# Patient Record
Sex: MALE | Race: WHITE | NOT HISPANIC OR LATINO | URBAN - METROPOLITAN AREA
[De-identification: names, ages, dates, MRNs, and addresses within clinical notes are randomized per-mention and may not be internally consistent; named-entity substitution may affect disease eponyms.]

---

## 2023-10-29 ENCOUNTER — HOSPITAL ENCOUNTER (EMERGENCY)
Facility: HOSPITAL | Age: 23
Discharge: HOME | End: 2023-10-29
Attending: STUDENT IN AN ORGANIZED HEALTH CARE EDUCATION/TRAINING PROGRAM
Payer: COMMERCIAL

## 2023-10-29 VITALS
HEART RATE: 90 BPM | HEIGHT: 72 IN | OXYGEN SATURATION: 98 % | RESPIRATION RATE: 14 BRPM | DIASTOLIC BLOOD PRESSURE: 87 MMHG | BODY MASS INDEX: 29.71 KG/M2 | WEIGHT: 219.36 LBS | SYSTOLIC BLOOD PRESSURE: 131 MMHG | TEMPERATURE: 97.7 F

## 2023-10-29 DIAGNOSIS — K62.5 RECTAL BLEEDING: Primary | ICD-10-CM

## 2023-10-29 LAB
ALBUMIN SERPL-MCNC: 4.8 G/DL (ref 3.5–5)
ALP BLD-CCNC: 94 U/L (ref 35–125)
ALT SERPL-CCNC: 61 U/L (ref 5–40)
ANION GAP SERPL CALC-SCNC: 11 MMOL/L
AST SERPL-CCNC: 31 U/L (ref 5–40)
BASOPHILS # BLD AUTO: 0.03 X10*3/UL (ref 0–0.1)
BASOPHILS NFR BLD AUTO: 0.4 %
BILIRUB SERPL-MCNC: 0.3 MG/DL (ref 0.1–1.2)
BUN SERPL-MCNC: 17 MG/DL (ref 8–25)
CALCIUM SERPL-MCNC: 9.8 MG/DL (ref 8.5–10.4)
CHLORIDE SERPL-SCNC: 100 MMOL/L (ref 97–107)
CO2 SERPL-SCNC: 28 MMOL/L (ref 24–31)
CREAT SERPL-MCNC: 0.9 MG/DL (ref 0.4–1.6)
EOSINOPHIL # BLD AUTO: 0.12 X10*3/UL (ref 0–0.7)
EOSINOPHIL NFR BLD AUTO: 1.6 %
ERYTHROCYTE [DISTWIDTH] IN BLOOD BY AUTOMATED COUNT: 11.7 % (ref 11.5–14.5)
GFR SERPL CREATININE-BSD FRML MDRD: >90 ML/MIN/1.73M*2
GLUCOSE SERPL-MCNC: 85 MG/DL (ref 65–99)
HCT VFR BLD AUTO: 47.6 % (ref 41–52)
HGB BLD-MCNC: 16.4 G/DL (ref 13.5–17.5)
IMM GRANULOCYTES # BLD AUTO: 0.02 X10*3/UL (ref 0–0.7)
IMM GRANULOCYTES NFR BLD AUTO: 0.3 % (ref 0–0.9)
LYMPHOCYTES # BLD AUTO: 2.15 X10*3/UL (ref 1.2–4.8)
LYMPHOCYTES NFR BLD AUTO: 28.5 %
MCH RBC QN AUTO: 28.8 PG (ref 26–34)
MCHC RBC AUTO-ENTMCNC: 34.5 G/DL (ref 32–36)
MCV RBC AUTO: 84 FL (ref 80–100)
MONOCYTES # BLD AUTO: 0.67 X10*3/UL (ref 0.1–1)
MONOCYTES NFR BLD AUTO: 8.9 %
NEUTROPHILS # BLD AUTO: 4.55 X10*3/UL (ref 1.2–7.7)
NEUTROPHILS NFR BLD AUTO: 60.3 %
NRBC BLD-RTO: 0 /100 WBCS (ref 0–0)
PLATELET # BLD AUTO: 314 X10*3/UL (ref 150–450)
PMV BLD AUTO: 10 FL (ref 7.5–11.5)
POTASSIUM SERPL-SCNC: 4.2 MMOL/L (ref 3.4–5.1)
PROT SERPL-MCNC: 8 G/DL (ref 5.9–7.9)
RBC # BLD AUTO: 5.69 X10*6/UL (ref 4.5–5.9)
SODIUM SERPL-SCNC: 139 MMOL/L (ref 133–145)
WBC # BLD AUTO: 7.5 X10*3/UL (ref 4.4–11.3)

## 2023-10-29 PROCEDURE — 99284 EMERGENCY DEPT VISIT MOD MDM: CPT | Performed by: STUDENT IN AN ORGANIZED HEALTH CARE EDUCATION/TRAINING PROGRAM

## 2023-10-29 PROCEDURE — 36415 COLL VENOUS BLD VENIPUNCTURE: CPT | Performed by: STUDENT IN AN ORGANIZED HEALTH CARE EDUCATION/TRAINING PROGRAM

## 2023-10-29 PROCEDURE — 80053 COMPREHEN METABOLIC PANEL: CPT | Performed by: STUDENT IN AN ORGANIZED HEALTH CARE EDUCATION/TRAINING PROGRAM

## 2023-10-29 PROCEDURE — 85025 COMPLETE CBC W/AUTO DIFF WBC: CPT | Performed by: STUDENT IN AN ORGANIZED HEALTH CARE EDUCATION/TRAINING PROGRAM

## 2023-10-29 PROCEDURE — 99283 EMERGENCY DEPT VISIT LOW MDM: CPT | Performed by: STUDENT IN AN ORGANIZED HEALTH CARE EDUCATION/TRAINING PROGRAM

## 2023-10-29 ASSESSMENT — PAIN - FUNCTIONAL ASSESSMENT: PAIN_FUNCTIONAL_ASSESSMENT: 0-10

## 2023-10-29 ASSESSMENT — PAIN SCALES - GENERAL: PAINLEVEL_OUTOF10: 0 - NO PAIN

## 2023-10-29 ASSESSMENT — COLUMBIA-SUICIDE SEVERITY RATING SCALE - C-SSRS
2. HAVE YOU ACTUALLY HAD ANY THOUGHTS OF KILLING YOURSELF?: NO
6. HAVE YOU EVER DONE ANYTHING, STARTED TO DO ANYTHING, OR PREPARED TO DO ANYTHING TO END YOUR LIFE?: NO
1. IN THE PAST MONTH, HAVE YOU WISHED YOU WERE DEAD OR WISHED YOU COULD GO TO SLEEP AND NOT WAKE UP?: NO

## 2023-10-30 NOTE — DISCHARGE INSTRUCTIONS
Follow-up with gastroenterology by calling their office tomorrow to schedule close follow-up appointment.  If symptoms worsen or change he can return at any time for further evaluation and treatment.

## 2023-10-30 NOTE — ED TRIAGE NOTES
Pt presents to the ED with c/c of bleeding from the rectum after a bowel movement. Pt states this is the second time this week.

## 2023-10-30 NOTE — ED PROVIDER NOTES
HPI   Chief Complaint   Patient presents with    Rectal Bleeding     X2 in a week       Patient is a 23-year-old male who presents emergency department for evaluation of concern for possible rectal bleeding.  Patient states that he has had red blood per rectum several times over the last 2 days.  He states that he initially noticed that only mixed in with stool and then had a second bowel movement later which was more bloody than stool.  Patient states he is never had this happen before.  He is not on any blood thinners.  He denies lightheadedness, dizziness, nausea, vomiting, fever, chills, rectal pain, abdominal pain, chest pain or shortness of breath.      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    Encounter for examination of ears and hearing without abnormal findings 03/31/2015    Encounter for hearing examination    Fracture of unspecified phalanx of unspecified finger, initial encounter for closed fracture 07/31/2013    Closed fracture of one or more phalanges of hand    Infectious mononucleosis, unspecified without complication 12/18/2017    Mononucleosis    Otitis media, unspecified, unspecified ear 03/31/2015    Ear infection    Personal history of other diseases of the nervous system and sense organs 04/01/2015    History of hearing loss    Personal history of other diseases of the respiratory system     History of throat disease    Personal history of other diseases of the respiratory system 03/31/2015    History of streptococcal pharyngitis    Personal history of other diseases of the respiratory system 03/31/2015    History of chronic pharyngitis    Unspecified disorder of ear, unspecified ear     Ear problem    Unspecified hearing loss, unspecified ear 01/06/2015    Hearing trouble    Unspecified internal  derangement of unspecified knee 08/02/2016    Knee locking     Past Surgical History:   Procedure Laterality Date    OTHER SURGICAL HISTORY  03/31/2015    Myringotomy    OTHER SURGICAL HISTORY  03/31/2015    Ear Surgery Eustachian Tube    OTHER SURGICAL HISTORY  01/06/2015    Ear Pressure Equalization Tube, Insertion    TONSILLECTOMY  03/31/2015    Tonsillectomy With Adenoidectomy     No family history on file.  Social History     Tobacco Use    Smoking status: Not on file    Smokeless tobacco: Not on file   Substance Use Topics    Alcohol use: Not on file    Drug use: Not on file       Physical Exam   ED Triage Vitals [10/29/23 2102]   Temp Heart Rate Resp BP   36.5 °C (97.7 °F) 90 14 (!) 150/101      SpO2 Temp Source Heart Rate Source Patient Position   97 % Oral Monitor Sitting      BP Location FiO2 (%)     Left arm --       Physical Exam  Vitals and nursing note reviewed.   Constitutional:       Appearance: Normal appearance.   HENT:      Head: Normocephalic and atraumatic.      Mouth/Throat:      Mouth: Mucous membranes are moist.   Eyes:      Extraocular Movements: Extraocular movements intact.      Pupils: Pupils are equal, round, and reactive to light.   Cardiovascular:      Rate and Rhythm: Normal rate and regular rhythm.   Pulmonary:      Effort: Pulmonary effort is normal.      Breath sounds: Normal breath sounds. No stridor. No wheezing or rhonchi.   Abdominal:      General: Abdomen is flat.      Palpations: Abdomen is soft.      Tenderness: There is no abdominal tenderness. There is no guarding or rebound.   Genitourinary:     Comments: No obvious fissure noted, no hemorrhoid palpated on exam and there is some trace blood on rectal exam  Skin:     General: Skin is warm and dry.      Coloration: Skin is not pale.   Neurological:      General: No focal deficit present.      Mental Status: He is alert.       Recent Results (from the past 24 hour(s))   CBC and Auto Differential    Collection Time: 10/29/23   9:51 PM   Result Value Ref Range    WBC 7.5 4.4 - 11.3 x10*3/uL    nRBC 0.0 0.0 - 0.0 /100 WBCs    RBC 5.69 4.50 - 5.90 x10*6/uL    Hemoglobin 16.4 13.5 - 17.5 g/dL    Hematocrit 47.6 41.0 - 52.0 %    MCV 84 80 - 100 fL    MCH 28.8 26.0 - 34.0 pg    MCHC 34.5 32.0 - 36.0 g/dL    RDW 11.7 11.5 - 14.5 %    Platelets 314 150 - 450 x10*3/uL    MPV 10.0 7.5 - 11.5 fL    Neutrophils % 60.3 40.0 - 80.0 %    Immature Granulocytes %, Automated 0.3 0.0 - 0.9 %    Lymphocytes % 28.5 13.0 - 44.0 %    Monocytes % 8.9 2.0 - 10.0 %    Eosinophils % 1.6 0.0 - 6.0 %    Basophils % 0.4 0.0 - 2.0 %    Neutrophils Absolute 4.55 1.20 - 7.70 x10*3/uL    Immature Granulocytes Absolute, Automated 0.02 0.00 - 0.70 x10*3/uL    Lymphocytes Absolute 2.15 1.20 - 4.80 x10*3/uL    Monocytes Absolute 0.67 0.10 - 1.00 x10*3/uL    Eosinophils Absolute 0.12 0.00 - 0.70 x10*3/uL    Basophils Absolute 0.03 0.00 - 0.10 x10*3/uL   Comprehensive metabolic panel    Collection Time: 10/29/23  9:51 PM   Result Value Ref Range    Glucose 85 65 - 99 mg/dL    Sodium 139 133 - 145 mmol/L    Potassium 4.2 3.4 - 5.1 mmol/L    Chloride 100 97 - 107 mmol/L    Bicarbonate 28 24 - 31 mmol/L    Urea Nitrogen 17 8 - 25 mg/dL    Creatinine 0.90 0.40 - 1.60 mg/dL    eGFR >90 >60 mL/min/1.73m*2    Calcium 9.8 8.5 - 10.4 mg/dL    Albumin 4.8 3.5 - 5.0 g/dL    Alkaline Phosphatase 94 35 - 125 U/L    Total Protein 8.0 (H) 5.9 - 7.9 g/dL    AST 31 5 - 40 U/L    Bilirubin, Total 0.3 0.1 - 1.2 mg/dL    ALT 61 (H) 5 - 40 U/L    Anion Gap 11 <=19 mmol/L       ED Course & MDM   Diagnoses as of 10/29/23 2218   Rectal bleeding       Medical Decision Making  Patient is a 23-year-old male who presents emergency department for evaluation of rectal bleeding.  Patient uncomfortable appearing but in no obvious distress on exam.  Vital signs are stable arrival.  He is awake, alert and oriented.  He has no abdominal tenderness palpation and rectal exam does show trace blood however there is  no obvious fissure or hemorrhoid palpated.  Blood work ordered including CBC, CMP.  CBC shows normal hemoglobin level of 16.4, normal white count of 7.5.  Patient is stable for discharge home at this time and will follow-up with gastroenterology as an outpatient as he remained hemodynamically stable and is otherwise asymptomatic.  Strict return precautions were discussed with patient.        Procedure  Procedures     Tucker Herron, DO  10/29/23 2217       Tucker Herron, DO  10/29/23 2213

## 2024-01-03 ENCOUNTER — OFFICE VISIT (OUTPATIENT)
Dept: PRIMARY CARE | Facility: CLINIC | Age: 24
End: 2024-01-03
Payer: COMMERCIAL

## 2024-01-03 VITALS
WEIGHT: 212 LBS | DIASTOLIC BLOOD PRESSURE: 74 MMHG | SYSTOLIC BLOOD PRESSURE: 120 MMHG | BODY MASS INDEX: 28.71 KG/M2 | OXYGEN SATURATION: 97 % | HEIGHT: 72 IN | HEART RATE: 88 BPM

## 2024-01-03 DIAGNOSIS — K62.5 RECTAL BLEEDING: Primary | ICD-10-CM

## 2024-01-03 DIAGNOSIS — R74.8 ELEVATED LIVER ENZYMES: ICD-10-CM

## 2024-01-03 DIAGNOSIS — Z91.018 NUT ALLERGY: ICD-10-CM

## 2024-01-03 PROCEDURE — 1036F TOBACCO NON-USER: CPT | Performed by: FAMILY MEDICINE

## 2024-01-03 PROCEDURE — 99203 OFFICE O/P NEW LOW 30 MIN: CPT | Performed by: FAMILY MEDICINE

## 2024-01-03 RX ORDER — EPINEPHRINE 0.3 MG/.3ML
0.3 INJECTION SUBCUTANEOUS ONCE AS NEEDED
COMMUNITY
Start: 2014-03-21 | End: 2024-01-09 | Stop reason: SDUPTHER

## 2024-01-03 RX ORDER — EPINEPHRINE 0.3 MG/.3ML
1 INJECTION SUBCUTANEOUS AS NEEDED
Qty: 1 EACH | Refills: 0 | Status: SHIPPED | OUTPATIENT
Start: 2024-01-03 | End: 2024-01-09 | Stop reason: SDUPTHER

## 2024-01-03 ASSESSMENT — PATIENT HEALTH QUESTIONNAIRE - PHQ9
2. FEELING DOWN, DEPRESSED OR HOPELESS: NOT AT ALL
1. LITTLE INTEREST OR PLEASURE IN DOING THINGS: NOT AT ALL
SUM OF ALL RESPONSES TO PHQ9 QUESTIONS 1 AND 2: 0

## 2024-01-03 ASSESSMENT — ENCOUNTER SYMPTOMS
ABDOMINAL PAIN: 0
ABDOMINAL DISTENTION: 0
RECTAL PAIN: 0
DIARRHEA: 0
BLOOD IN STOOL: 1
CONSTIPATION: 1

## 2024-01-03 ASSESSMENT — PAIN SCALES - GENERAL: PAINLEVEL: 0-NO PAIN

## 2024-01-03 NOTE — PROGRESS NOTES
"[facility]: [facility]  E/M EVALUATION    South Curry is a 23 y.o. male who presents for Establish Care (Pt was seeing Dr Monsivais (pediatrician) with [facility]/dmw/Pt had rectal bleeding two months ago, went to GI - recommended he have colonoscopy, blood work and liver ultrasound, would lke to discuss/dmw).    Subjective   Pt here to establish a new PCP    Pt states he had 1 time rectal bleeding, went to ER.  Referred to GI,  was recommended liver US, labs, colonoscopy.      Pt states no re-occurrence of bleeding.  Bleeding occurred with BM.  Was not associated with pain.      Labs in ER showed alt of 61 nml ast.  He states he has started eating less junk food. Exercising more.      Alcohol-  2 x per week, 6 beers.        Review of Systems   Gastrointestinal:  Positive for blood in stool and constipation. Negative for abdominal distention, abdominal pain, diarrhea and rectal pain.       Objective   Vitals:    01/03/24 0844   BP: 120/74   Pulse: 88   SpO2: 97%     Physical Exam  Constitutional:       Appearance: Normal appearance.   Cardiovascular:      Rate and Rhythm: Normal rate and regular rhythm.      Heart sounds: No murmur heard.  Pulmonary:      Effort: Pulmonary effort is normal.      Breath sounds: Normal breath sounds.   Neurological:      Mental Status: He is alert.       Cholesterol   Date Value Ref Range Status   12/13/2022 244 (H) 133 - 200 MG/DL Final     Triglycerides   Date Value Ref Range Status   12/13/2022 132 40 - 150 MG/DL Final     HDL   Date Value Ref Range Status   12/13/2022 56 >40 MG/DL Final     Comment:     National Cholesterol Education Program(NCFP)guidelines:   <40 mg/dl:Low HDL-cholesterol(major risk factor for CHD)   >60 mg/dl:High HDL-cholesterol(\"negative\"risk factor for CHD)   HDL-cholesterol is affected by a number of factors,e.g.,smoking,  exercise,hormones,sex and age.       LDL Calculated   Date Value Ref Range Status   12/13/2022 162 (H) 65 - 130 MG/DL Final "     Cholesterol/HDL Ratio   Date Value Ref Range Status   12/13/2022 4.4 RATIO Final     Comment:     According to the American Heart Association, the goal is to maintain   the total cholesterol/HDL ratio at 5-to-1 or lower with an optimum   ratio of 3.5-to-1.  Performed at [address] [address]       Glucose   Date Value Ref Range Status   10/29/2023 85 65 - 99 mg/dL Final     Sodium   Date Value Ref Range Status   10/29/2023 139 133 - 145 mmol/L Final     Potassium   Date Value Ref Range Status   10/29/2023 4.2 3.4 - 5.1 mmol/L Final     ALT   Date Value Ref Range Status   10/29/2023 61 (H) 5 - 40 U/L Final     eGFR   Date Value Ref Range Status   10/29/2023 >90 >60 mL/min/1.73m*2 Final     Comment:     Calculations of estimated GFR are performed using the 2021 CKD-EPI Study Refit equation without the race variable for the IDMS-Traceable creatinine methods.  https://jasn.asnjournals.org/content/early/2021/09/22/ASN.5969680707       Assessment/Plan      There is no problem list on file for this patient.      Diagnoses and all orders for this visit:  Rectal bleeding  Elevated liver enzymes  -     Comprehensive metabolic panel; Future  -     Gamma GT; Future  Reduce alcohol, lower fat and triglycerides, regular exercise.  Doubt serious reason for bleeding but encouraged eval if re-occurs.     The patient was encouraged to ensure that any/all documentation is accurate and up to date, and that our office be provided a copy in the event that anything changes.         Wilman Ortiz MD

## 2024-01-09 DIAGNOSIS — Z91.018 NUT ALLERGY: ICD-10-CM

## 2024-01-10 RX ORDER — EPINEPHRINE 0.3 MG/.3ML
1 INJECTION SUBCUTANEOUS AS NEEDED
Qty: 1 EACH | Refills: 0 | Status: SHIPPED | OUTPATIENT
Start: 2024-01-10 | End: 2025-01-09

## 2024-01-10 RX ORDER — EPINEPHRINE 0.3 MG/.3ML
0.3 INJECTION SUBCUTANEOUS ONCE AS NEEDED
Qty: 1 EACH | Refills: 0
Start: 2024-01-10

## 2024-02-07 ENCOUNTER — APPOINTMENT (OUTPATIENT)
Dept: PRIMARY CARE | Facility: CLINIC | Age: 24
End: 2024-02-07
Payer: COMMERCIAL

## 2024-12-09 ENCOUNTER — APPOINTMENT (OUTPATIENT)
Dept: OPHTHALMOLOGY | Facility: CLINIC | Age: 24
End: 2024-12-09
Payer: COMMERCIAL

## 2024-12-09 DIAGNOSIS — H52.223 REGULAR ASTIGMATISM OF BOTH EYES: ICD-10-CM

## 2024-12-09 DIAGNOSIS — D31.31 CHOROIDAL NEVUS OF RIGHT EYE: Primary | ICD-10-CM

## 2024-12-09 PROBLEM — D31.32 CHOROIDAL NEVUS OF LEFT EYE: Status: ACTIVE | Noted: 2024-12-09

## 2024-12-09 PROCEDURE — 92004 COMPRE OPH EXAM NEW PT 1/>: CPT | Performed by: STUDENT IN AN ORGANIZED HEALTH CARE EDUCATION/TRAINING PROGRAM

## 2024-12-09 PROCEDURE — 92015 DETERMINE REFRACTIVE STATE: CPT | Performed by: STUDENT IN AN ORGANIZED HEALTH CARE EDUCATION/TRAINING PROGRAM

## 2024-12-09 RX ORDER — TRIAMCINOLONE ACETONIDE 1 MG/G
CREAM TOPICAL
COMMUNITY
Start: 2019-07-08

## 2024-12-09 RX ORDER — SERTRALINE HYDROCHLORIDE 50 MG/1
1 TABLET, FILM COATED ORAL DAILY
COMMUNITY

## 2024-12-09 ASSESSMENT — CONF VISUAL FIELD
OD_INFERIOR_TEMPORAL_RESTRICTION: 0
OD_NORMAL: 1
OS_SUPERIOR_NASAL_RESTRICTION: 0
OD_SUPERIOR_TEMPORAL_RESTRICTION: 0
OS_INFERIOR_TEMPORAL_RESTRICTION: 0
OS_INFERIOR_NASAL_RESTRICTION: 0
OD_SUPERIOR_NASAL_RESTRICTION: 0
OS_SUPERIOR_TEMPORAL_RESTRICTION: 0
OS_NORMAL: 1
METHOD: COUNTING FINGERS
OD_INFERIOR_NASAL_RESTRICTION: 0

## 2024-12-09 ASSESSMENT — REFRACTION_MANIFEST
OS_SPHERE: -0.75
OS_CYLINDER: +0.75
OD_CYLINDER: +1.25
OS_CYLINDER: +0.75
OD_SPHERE: -0.75
OS_SPHERE: -1.00
OD_SPHERE: -0.75
OD_CYLINDER: +1.25
OS_AXIS: 167
OD_AXIS: 016
METHOD_AUTOREFRACTION: 1
OD_AXIS: 015
OS_AXIS: 170

## 2024-12-09 ASSESSMENT — REFRACTION_WEARINGRX
OD_SPHERE: -0.50
OD_CYLINDER: +0.50
OS_SPHERE: -0.75
OD_AXIS: 024
OS_CYLINDER: +0.25
OS_AXIS: 015

## 2024-12-09 ASSESSMENT — ENCOUNTER SYMPTOMS
EYES NEGATIVE: 1
RESPIRATORY NEGATIVE: 0
GASTROINTESTINAL NEGATIVE: 0
NEUROLOGICAL NEGATIVE: 0
ALLERGIC/IMMUNOLOGIC NEGATIVE: 0
MUSCULOSKELETAL NEGATIVE: 0
PSYCHIATRIC NEGATIVE: 0
HEMATOLOGIC/LYMPHATIC NEGATIVE: 0
CARDIOVASCULAR NEGATIVE: 0
CONSTITUTIONAL NEGATIVE: 0
ENDOCRINE NEGATIVE: 0

## 2024-12-09 ASSESSMENT — TONOMETRY
OS_IOP_MMHG: 18
IOP_METHOD: GOLDMANN APPLANATION
OD_IOP_MMHG: 16

## 2024-12-09 ASSESSMENT — SLIT LAMP EXAM - LIDS
COMMENTS: NORMAL
COMMENTS: NORMAL

## 2024-12-09 ASSESSMENT — CUP TO DISC RATIO
OD_RATIO: .30
OS_RATIO: .30

## 2024-12-09 ASSESSMENT — VISUAL ACUITY
OD_CC: 20/20
CORRECTION_TYPE: GLASSES
OS_CC: 20/20
METHOD: SNELLEN - LINEAR
OD_CC+: -1

## 2024-12-09 ASSESSMENT — EXTERNAL EXAM - RIGHT EYE: OD_EXAM: NORMAL

## 2024-12-09 ASSESSMENT — EXTERNAL EXAM - LEFT EYE: OS_EXAM: NORMAL

## 2024-12-09 NOTE — PROGRESS NOTES
Assessment/Plan   Diagnoses and all orders for this visit:  Regular astigmatism of both eyes  -New Rx for glasses given per patient request. Patient's signature obtained to acknowledge and confirm that a paper copy of glasses Rx was given to patient in compliance with [facility]lass Rule. Electronic copy of Rx will also be available via [facility].   -New spec rx released today per patient request. Ocular health wnl for age OU. Monitor 1 year or sooner prn. Refraction billed today.  Choroidal nevus of right eye  -small nevus with benign characteristics  -pt ed; monitor annually    RTC 1 year for annual with DFE and MRX

## 2025-01-08 ENCOUNTER — OFFICE VISIT (OUTPATIENT)
Dept: PRIMARY CARE | Facility: CLINIC | Age: 25
End: 2025-01-08
Payer: COMMERCIAL

## 2025-01-08 VITALS
WEIGHT: 213.8 LBS | DIASTOLIC BLOOD PRESSURE: 80 MMHG | BODY MASS INDEX: 29 KG/M2 | OXYGEN SATURATION: 99 % | SYSTOLIC BLOOD PRESSURE: 132 MMHG | HEART RATE: 78 BPM

## 2025-01-08 DIAGNOSIS — R74.8 ELEVATED LIVER ENZYMES: ICD-10-CM

## 2025-01-08 DIAGNOSIS — Z13.220 LIPID SCREENING: ICD-10-CM

## 2025-01-08 DIAGNOSIS — Z13.1 SCREENING FOR DIABETES MELLITUS: ICD-10-CM

## 2025-01-08 DIAGNOSIS — Z00.00 ROUTINE GENERAL MEDICAL EXAMINATION AT A HEALTH CARE FACILITY: Primary | ICD-10-CM

## 2025-01-08 DIAGNOSIS — Z13.0 SCREENING, ANEMIA, DEFICIENCY, IRON: ICD-10-CM

## 2025-01-08 PROCEDURE — 1036F TOBACCO NON-USER: CPT | Performed by: FAMILY MEDICINE

## 2025-01-08 PROCEDURE — 99395 PREV VISIT EST AGE 18-39: CPT | Performed by: FAMILY MEDICINE

## 2025-01-08 ASSESSMENT — ENCOUNTER SYMPTOMS
HEMATOLOGIC/LYMPHATIC NEGATIVE: 1
NEUROLOGICAL NEGATIVE: 1
GASTROINTESTINAL NEGATIVE: 1
CARDIOVASCULAR NEGATIVE: 1
MUSCULOSKELETAL NEGATIVE: 1
CONSTITUTIONAL NEGATIVE: 1
PSYCHIATRIC NEGATIVE: 1
RESPIRATORY NEGATIVE: 1

## 2025-01-08 ASSESSMENT — COLUMBIA-SUICIDE SEVERITY RATING SCALE - C-SSRS
2. HAVE YOU ACTUALLY HAD ANY THOUGHTS OF KILLING YOURSELF?: NO
1. IN THE PAST MONTH, HAVE YOU WISHED YOU WERE DEAD OR WISHED YOU COULD GO TO SLEEP AND NOT WAKE UP?: NO
6. HAVE YOU EVER DONE ANYTHING, STARTED TO DO ANYTHING, OR PREPARED TO DO ANYTHING TO END YOUR LIFE?: NO

## 2025-01-08 ASSESSMENT — PATIENT HEALTH QUESTIONNAIRE - PHQ9
2. FEELING DOWN, DEPRESSED OR HOPELESS: NOT AT ALL
SUM OF ALL RESPONSES TO PHQ9 QUESTIONS 1 AND 2: 0
1. LITTLE INTEREST OR PLEASURE IN DOING THINGS: NOT AT ALL

## 2025-01-08 ASSESSMENT — PAIN SCALES - GENERAL: PAINLEVEL_OUTOF10: 0-NO PAIN

## 2025-01-08 NOTE — PROGRESS NOTES
[facility]: [facility]  PHYSICAL EXAM      South Curry is a 24 y.o. male that is presenting today for Annual Exam (Pt is here for physical, no new concerns / nr).     Subjective   Pt here for annual exam.      Rectal bleeding resolved, last case associated with diarrhea.  States will go to GI if re-occurs.      Reduced alcohol intake.  Had elevated alt in past.       No regular exercise.       Review of Systems   Constitutional: Negative.    HENT: Negative.     Respiratory: Negative.     Cardiovascular: Negative.    Gastrointestinal: Negative.    Genitourinary: Negative.    Musculoskeletal: Negative.    Skin: Negative.    Neurological: Negative.    Hematological: Negative.    Psychiatric/Behavioral: Negative.         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    Encounter for examination of ears and hearing without abnormal findings 03/31/2015    Encounter for hearing examination    Fracture of unspecified phalanx of unspecified finger, initial encounter for closed fracture 07/31/2013    Closed fracture of one or more phalanges of hand    Infectious mononucleosis, unspecified without complication 12/18/2017    Mononucleosis    Otitis media, unspecified, unspecified ear 03/31/2015    Ear infection    Personal history of other diseases of the nervous system and sense organs 04/01/2015    History of hearing loss    Personal history of other diseases of the respiratory system     History of throat disease    Personal history of other diseases of the respiratory system 03/31/2015    History of streptococcal pharyngitis    Personal history of other diseases of the respiratory system 03/31/2015    History of chronic pharyngitis    Unspecified disorder of ear, unspecified ear     Ear problem    Unspecified hearing loss, unspecified ear 01/06/2015    Hearing trouble    Unspecified internal derangement of  unspecified knee 08/02/2016    Knee locking     Past Surgical History:   Procedure Laterality Date    OTHER SURGICAL HISTORY  03/31/2015    Myringotomy    OTHER SURGICAL HISTORY  03/31/2015    Ear Surgery Eustachian Tube    OTHER SURGICAL HISTORY  01/06/2015    Ear Pressure Equalization Tube, Insertion    TONSILLECTOMY  03/31/2015    Tonsillectomy With Adenoidectomy     No family history on file.  Allergies   Allergen Reactions    Nut - Unspecified Swelling    Peanut Anaphylaxis    Tree Nuts Anaphylaxis     Current Outpatient Medications on File Prior to Visit   Medication Sig Dispense Refill    EPINEPHrine (Epipen) 0.3 mg/0.3 mL injection syringe Inject 0.3 mL (0.3 mg) into the muscle if needed for anaphylaxis. Call 911 after use. 1 each 0    EPINEPHrine 0.3 mg/0.3 mL injection syringe Inject 0.3 mL (0.3 mg) into the muscle 1 time if needed for anaphylaxis. As Directed 1 each 0    [DISCONTINUED] sertraline (Zoloft) 50 mg tablet Take 1 tablet (50 mg) by mouth once daily. (Patient not taking: Reported on 1/8/2025)      [DISCONTINUED] triamcinolone (Kenalog) 0.1 % cream Apply topically. Apply to affected areas 2-3 times daily (Patient not taking: Reported on 1/8/2025)       No current facility-administered medications on file prior to visit.     Immunization History   Administered Date(s) Administered    DTaP vaccine, pediatric  (INFANRIX) 1998, 1998, 02/23/1999, 03/03/2000    DTaP, Unspecified 03/26/2004    Flu vaccine (IIV4), preservative free *Check age/dose* 09/28/2016, 10/08/2019, 01/14/2021, 09/22/2021    HPV, Quadrivalent 07/31/2013, 10/26/2013, 08/01/2014    Hepatitis A vaccine, pediatric/adolescent (HAVRIX, VAQTA) 07/31/2013, 08/01/2014    Hepatitis B vaccine, adult *Check Product/Dose* 1998, 1998, 05/25/1999    HiB PRP-OMP conjugate vaccine, pediatric (PEDVAXHIB) 1998, 1998, 02/23/1999, 03/03/2000    Influenza, live, intranasal, quadrivalent 09/03/2009, 09/02/2010,  10/13/2011    Influenza, seasonal, injectable 10/23/2003, 11/25/2003    Meningococcal ACWY-D (Menactra) 4-valent conjugate vaccine 09/03/2009, 08/19/2015    PPD Test 04/18/2016, 10/01/2019, 10/16/2019, 01/14/2021, 02/09/2021    Pfizer Purple Cap SARS-CoV-2 02/05/2022, 02/26/2022    Poliovirus vaccine, subcutaneous (IPOL) 03/26/2004    Tdap vaccine, age 7 year and older (BOOSTRIX, ADACEL) 09/03/2009, 10/16/2019    Varicella vaccine, subcutaneous (VARIVAX) 09/01/1999, 09/03/2009     Patient's medical history was reviewed and updated either before or during this encounter.    Objective   Vitals:    01/08/25 0903   BP: 132/80   Pulse: 78   SpO2: 99%      Physical Exam  Constitutional:       General: He is not in acute distress.     Appearance: Normal appearance. He is not ill-appearing.   HENT:      Right Ear: Tympanic membrane, ear canal and external ear normal. There is no impacted cerumen.      Left Ear: Tympanic membrane, ear canal and external ear normal.      Nose: Nose normal.      Mouth/Throat:      Pharynx: Oropharynx is clear. No posterior oropharyngeal erythema.   Neck:      Thyroid: No thyroid mass or thyroid tenderness.      Vascular: No carotid bruit.   Cardiovascular:      Rate and Rhythm: Normal rate and regular rhythm.      Pulses:           Radial pulses are 2+ on the right side and 2+ on the left side.        Dorsalis pedis pulses are 2+ on the right side and 2+ on the left side.      Heart sounds: Normal heart sounds. No murmur heard.     No friction rub. No gallop.   Pulmonary:      Effort: Pulmonary effort is normal.      Breath sounds: Normal breath sounds.   Abdominal:      General: Bowel sounds are normal.      Palpations: Abdomen is soft. There is no hepatomegaly or splenomegaly.      Tenderness: There is no abdominal tenderness.   Musculoskeletal:      Cervical back: Normal range of motion. No tenderness.      Right lower leg: No edema.      Left lower leg: No edema.      Comments: No gross  abnormalities    Lymphadenopathy:      Cervical: No cervical adenopathy.      Upper Body:      Right upper body: No supraclavicular adenopathy.      Left upper body: No supraclavicular adenopathy.   Skin:     General: Skin is warm.      Capillary Refill: Capillary refill takes 2 to 3 seconds.   Neurological:      General: No focal deficit present.      Mental Status: He is alert.      Cranial Nerves: Cranial nerves 2-12 are intact.      Coordination: Coordination is intact.      Gait: Gait is intact.      Comments: No obvious neurological deficits    Psychiatric:         Mood and Affect: Mood and affect normal.           Assessment/Plan      Patient Active Problem List   Diagnosis    Regular astigmatism of both eyes    Choroidal nevus of left eye     Diagnoses and all orders for this visit:  Routine general medical examination at a health care facility  -     Comprehensive Metabolic Panel; Future  -     Lipid Panel; Future  -     CBC and Auto Differential; Future  Lipid screening  -     Lipid Panel; Future  Screening for diabetes mellitus  -     Comprehensive Metabolic Panel; Future  Elevated liver enzymes  Screening, anemia, deficiency, iron  -     CBC and Auto Differential; Future  Exercise 150 min per week.  GI if rectal bleeding re-occurs. Testicular self exams.    The patient was encouraged to ensure that any/all documentation is accurate and up to date, and that our office be provided a copy in the event that anything changes.    Wilman Ortiz MD